# Patient Record
Sex: FEMALE | Race: WHITE | NOT HISPANIC OR LATINO | URBAN - METROPOLITAN AREA
[De-identification: names, ages, dates, MRNs, and addresses within clinical notes are randomized per-mention and may not be internally consistent; named-entity substitution may affect disease eponyms.]

---

## 2019-03-13 ENCOUNTER — OFFICE VISIT (OUTPATIENT)
Dept: OBSTETRICS AND GYNECOLOGY | Facility: CLINIC | Age: 19
End: 2019-03-13
Payer: COMMERCIAL

## 2019-03-13 VITALS — SYSTOLIC BLOOD PRESSURE: 98 MMHG | WEIGHT: 111.56 LBS | DIASTOLIC BLOOD PRESSURE: 64 MMHG

## 2019-03-13 DIAGNOSIS — N92.1 BREAKTHROUGH BLEEDING ON NEXPLANON: Primary | ICD-10-CM

## 2019-03-13 DIAGNOSIS — Z97.5 BREAKTHROUGH BLEEDING ON NEXPLANON: Primary | ICD-10-CM

## 2019-03-13 PROCEDURE — 99999 PR PBB SHADOW E&M-NEW PATIENT-LVL II: CPT | Mod: PBBFAC,,, | Performed by: OBSTETRICS & GYNECOLOGY

## 2019-03-13 PROCEDURE — 99203 PR OFFICE/OUTPT VISIT, NEW, LEVL III, 30-44 MIN: ICD-10-PCS | Mod: S$GLB,,, | Performed by: OBSTETRICS & GYNECOLOGY

## 2019-03-13 PROCEDURE — 99999 PR PBB SHADOW E&M-NEW PATIENT-LVL II: ICD-10-PCS | Mod: PBBFAC,,, | Performed by: OBSTETRICS & GYNECOLOGY

## 2019-03-13 PROCEDURE — 99203 OFFICE O/P NEW LOW 30 MIN: CPT | Mod: S$GLB,,, | Performed by: OBSTETRICS & GYNECOLOGY

## 2019-03-13 RX ORDER — ESTRADIOL 2 MG/1
2 TABLET ORAL DAILY
Qty: 14 TABLET | Refills: 0 | Status: SHIPPED | OUTPATIENT
Start: 2019-03-13 | End: 2019-05-29

## 2019-04-29 ENCOUNTER — TELEPHONE (OUTPATIENT)
Dept: OBSTETRICS AND GYNECOLOGY | Facility: CLINIC | Age: 19
End: 2019-04-29

## 2019-04-29 NOTE — TELEPHONE ENCOUNTER
----- Message from Bertha Painting sent at 4/29/2019  3:02 PM CDT -----  Contact: self [phone]  States that she is calling to schedule an appt with Dr Russell for nexplanon removal. Please call back at [phone]//thank you acc

## 2019-04-29 NOTE — TELEPHONE ENCOUNTER
Spoke to patient and she would just like her nexplanon removed. Does not want another one inserted. Scheduled her appointment for 05/29/19 at 9:30am to see Dr. Russell at the [facility] location. Patient verbalized understanding.

## 2019-05-22 ENCOUNTER — TELEPHONE (OUTPATIENT)
Dept: OBSTETRICS AND GYNECOLOGY | Facility: CLINIC | Age: 19
End: 2019-05-22

## 2019-05-22 NOTE — TELEPHONE ENCOUNTER
Did not see a referral in patient's chart.  Called patient, and she had no idea what I was talking about.  She states that she did not call asking about a referral.

## 2019-05-22 NOTE — TELEPHONE ENCOUNTER
Patient is requesting a call back from physicians staff in regards to the referral. Unsure what referral is for. Message sent from appointment desk Tyria Milligan.     Please assist.

## 2019-05-29 ENCOUNTER — PROCEDURE VISIT (OUTPATIENT)
Dept: OBSTETRICS AND GYNECOLOGY | Facility: CLINIC | Age: 19
End: 2019-05-29
Payer: COMMERCIAL

## 2019-05-29 VITALS
DIASTOLIC BLOOD PRESSURE: 62 MMHG | BODY MASS INDEX: 17.65 KG/M2 | HEIGHT: 67 IN | WEIGHT: 112.44 LBS | SYSTOLIC BLOOD PRESSURE: 108 MMHG

## 2019-05-29 DIAGNOSIS — Z30.013 ENCOUNTER FOR INITIAL PRESCRIPTION OF INJECTABLE CONTRACEPTIVE: ICD-10-CM

## 2019-05-29 DIAGNOSIS — Z30.46 NEXPLANON REMOVAL: Primary | ICD-10-CM

## 2019-05-29 PROCEDURE — 96372 PR INJECTION,THERAP/PROPH/DIAG2ST, IM OR SUBCUT: ICD-10-PCS | Mod: 59,S$GLB,, | Performed by: OBSTETRICS & GYNECOLOGY

## 2019-05-29 PROCEDURE — 96372 THER/PROPH/DIAG INJ SC/IM: CPT | Mod: 59,S$GLB,, | Performed by: OBSTETRICS & GYNECOLOGY

## 2019-05-29 PROCEDURE — 11982 REMOVE DRUG IMPLANT DEVICE: CPT | Mod: S$GLB,,, | Performed by: OBSTETRICS & GYNECOLOGY

## 2019-05-29 PROCEDURE — 11982 PR REMOVAL DRUG IMPLANT DEVICE: ICD-10-PCS | Mod: S$GLB,,, | Performed by: OBSTETRICS & GYNECOLOGY

## 2019-05-29 RX ORDER — MEDROXYPROGESTERONE ACETATE 150 MG/ML
150 INJECTION, SUSPENSION INTRAMUSCULAR
Status: DISCONTINUED | OUTPATIENT
Start: 2019-05-29 | End: 2019-11-20

## 2019-05-29 RX ADMIN — MEDROXYPROGESTERONE ACETATE 150 MG: 150 INJECTION, SUSPENSION INTRAMUSCULAR at 10:05

## 2019-05-29 NOTE — NURSING
Verified pt with two identifiers name and . Allergies and medications reviewed. Depo provera 150 mg/ml administered IM to right ventrogluteal while using aseptic technique. No discomfort noted. Pt tolerated well. Advised pt to wait 15 minutes in the lobby to monitor for side effects. Next scheduled injection 19 @ 1100 at the [facility].  Pt verbalized understanding.

## 2019-05-29 NOTE — PROCEDURES
Procedures   PROCEDURE:  Nexplanon implant palpated well in medial left arm. Area prepped with betadine. Lidocaine with epi locally injected subdermally. #11 blade used to score skin over distal tip & dissected down to implant. Hemostats used to remove implant intact by grasping distal tip. Minimal blood loss. Pt tolerated procedure. Pt desires depo-provera    A/P  1. nexplanon removal, contraceptive management  2. Depo-provera 150 mg IM q90d x 1 year

## 2019-11-20 ENCOUNTER — OFFICE VISIT (OUTPATIENT)
Dept: OBSTETRICS AND GYNECOLOGY | Facility: CLINIC | Age: 19
End: 2019-11-20
Payer: COMMERCIAL

## 2019-11-20 VITALS
SYSTOLIC BLOOD PRESSURE: 100 MMHG | WEIGHT: 115.5 LBS | HEIGHT: 67 IN | BODY MASS INDEX: 18.13 KG/M2 | DIASTOLIC BLOOD PRESSURE: 54 MMHG

## 2019-11-20 DIAGNOSIS — N93.0 POSTCOITAL BLEEDING: ICD-10-CM

## 2019-11-20 DIAGNOSIS — N76.2 ACUTE VULVITIS: Primary | ICD-10-CM

## 2019-11-20 DIAGNOSIS — N89.8 VAGINAL DISCHARGE: ICD-10-CM

## 2019-11-20 PROCEDURE — 87210 SMEAR WET MOUNT SALINE/INK: CPT | Mod: QW,S$GLB,, | Performed by: OBSTETRICS & GYNECOLOGY

## 2019-11-20 PROCEDURE — 99999 PR PBB SHADOW E&M-EST. PATIENT-LVL II: CPT | Mod: PBBFAC,,, | Performed by: OBSTETRICS & GYNECOLOGY

## 2019-11-20 PROCEDURE — 99213 OFFICE O/P EST LOW 20 MIN: CPT | Mod: S$GLB,,, | Performed by: OBSTETRICS & GYNECOLOGY

## 2019-11-20 PROCEDURE — 3008F BODY MASS INDEX DOCD: CPT | Mod: CPTII,S$GLB,, | Performed by: OBSTETRICS & GYNECOLOGY

## 2019-11-20 PROCEDURE — 99213 PR OFFICE/OUTPT VISIT, EST, LEVL III, 20-29 MIN: ICD-10-PCS | Mod: S$GLB,,, | Performed by: OBSTETRICS & GYNECOLOGY

## 2019-11-20 PROCEDURE — 99999 PR PBB SHADOW E&M-EST. PATIENT-LVL II: ICD-10-PCS | Mod: PBBFAC,,, | Performed by: OBSTETRICS & GYNECOLOGY

## 2019-11-20 PROCEDURE — 87210 PR  SMEAR,STAIN,WET MNT,INTERP: ICD-10-PCS | Mod: QW,S$GLB,, | Performed by: OBSTETRICS & GYNECOLOGY

## 2019-11-20 PROCEDURE — 3008F PR BODY MASS INDEX (BMI) DOCUMENTED: ICD-10-PCS | Mod: CPTII,S$GLB,, | Performed by: OBSTETRICS & GYNECOLOGY

## 2019-11-20 RX ORDER — CLOTRIMAZOLE AND BETAMETHASONE DIPROPIONATE 10; .64 MG/G; MG/G
CREAM TOPICAL 2 TIMES DAILY
Qty: 15 G | Refills: 1 | Status: SHIPPED | OUTPATIENT
Start: 2019-11-20 | End: 2019-11-30

## 2019-11-20 RX ORDER — NORGESTIMATE AND ETHINYL ESTRADIOL 0.25-0.035
1 KIT ORAL DAILY
Refills: 11 | COMMUNITY
Start: 2019-10-31 | End: 2021-04-14

## 2019-11-20 NOTE — PROGRESS NOTES
Subjective:       Patient ID: Shantell Pérez is a 19 y.o. female.    Chief Complaint:  Oligomenorrhea (Concerns) and Painful Lookout      History of Present Illness  HPI  c/o postcoital bleeding & vaginal pain during intercourse. feels like friction but not sure. does not use any lubricants or sex toys. has only had intercourse 4x this past year w/ same partner & had problems all times.  Finishing up first pack of ocps. Tested neg for leonid/chlam last month w/ [place]gyn [place]    GYN & OB History  No LMP recorded. (Menstrual status: Birth Control).   Date of Last Pap: No result found    OB History    Para Term  AB Living   0 0 0 0 0 0   SAB TAB Ectopic Multiple Live Births   0 0 0 0 0       Review of Systems  Review of Systems   All other systems reviewed and are negative.      Objective:     Physical Exam   Constitutional: She is oriented to person, place, and time. She appears well-developed and well-nourished.   Pulmonary/Chest: Effort normal.   Genitourinary:   Genitourinary Comments: Few areas of vulvar skin fissures & ?pinpoint ulcerations. Normal vaginal mucosa/cervix   Musculoskeletal: Normal range of motion.   Neurological: She is alert and oriented to person, place, and time.   Skin: Skin is warm and dry.   Psychiatric: She has a normal mood and affect. Her behavior is normal.      Wet prep neg     Assessment:        1. Acute vulvitis    2. Postcoital bleeding    3. Vaginal discharge         Plan:      1. aquaphor & lotrisone-vulvar irritation possible cause of dyspareunia

## 2020-06-26 ENCOUNTER — OFFICE VISIT (OUTPATIENT)
Dept: URGENT CARE | Facility: CLINIC | Age: 20
End: 2020-06-26
Payer: COMMERCIAL

## 2020-06-26 VITALS
HEIGHT: 67 IN | DIASTOLIC BLOOD PRESSURE: 65 MMHG | BODY MASS INDEX: 18.05 KG/M2 | TEMPERATURE: 98 F | SYSTOLIC BLOOD PRESSURE: 117 MMHG | WEIGHT: 115 LBS | HEART RATE: 67 BPM | OXYGEN SATURATION: 98 %

## 2020-06-26 DIAGNOSIS — Z13.9 ENCOUNTER FOR SCREENING: Primary | ICD-10-CM

## 2020-06-26 PROCEDURE — U0003 INFECTIOUS AGENT DETECTION BY NUCLEIC ACID (DNA OR RNA); SEVERE ACUTE RESPIRATORY SYNDROME CORONAVIRUS 2 (SARS-COV-2) (CORONAVIRUS DISEASE [COVID-19]), AMPLIFIED PROBE TECHNIQUE, MAKING USE OF HIGH THROUGHPUT TECHNOLOGIES AS DESCRIBED BY CMS-2020-01-R: HCPCS

## 2020-06-26 PROCEDURE — 99201 PR OFFICE/OUTPT VISIT,NEW,LEVL I: CPT | Mod: S$GLB,,, | Performed by: NURSE PRACTITIONER

## 2020-06-26 PROCEDURE — 99201 PR OFFICE/OUTPT VISIT,NEW,LEVL I: ICD-10-PCS | Mod: S$GLB,,, | Performed by: NURSE PRACTITIONER

## 2020-06-26 NOTE — PATIENT INSTRUCTIONS

## 2020-06-26 NOTE — PROGRESS NOTES
"Subjective:       Patient ID: Shantell Pérez is a 20 y.o. female.    Vitals:  height is 5' 7" (1.702 m) and weight is 52.2 kg (115 lb). Her temperature is 98.1 °F (36.7 °C). Her blood pressure is 117/65 and her pulse is 67. Her oxygen saturation is 98%.     Chief Complaint: COVID-19 Concerns    Pt states she was exposed to covid at work. Denies having any symptoms.      Constitution: Negative for appetite change, chills, fatigue and fever.   HENT: Negative for ear pain, congestion and sore throat.    Neck: Negative for painful lymph nodes.   Cardiovascular: Negative for chest pain and leg swelling.   Eyes: Negative for eye discharge, eye redness, double vision and blurred vision.   Respiratory: Negative for cough and shortness of breath.    Gastrointestinal: Negative for nausea, vomiting and diarrhea.   Genitourinary: Negative for dysuria, frequency, urgency and history of kidney stones.   Musculoskeletal: Negative for joint pain, joint swelling, muscle cramps and muscle ache.   Skin: Negative for color change, pale, rash and bruising.   Allergic/Immunologic: Negative for seasonal allergies.   Neurological: Negative for dizziness, history of vertigo, light-headedness, passing out, headaches and seizures.   Hematologic/Lymphatic: Negative for swollen lymph nodes.   Psychiatric/Behavioral: Negative for nervous/anxious, sleep disturbance and depression. The patient is not nervous/anxious.        Objective:      Physical Exam   Constitutional: She appears well-developed.   Limited physical exam due to current state of emergency- no acute distress   Pulmonary/Chest: Effort normal and breath sounds normal.   Abdominal: Normal appearance.   Musculoskeletal: Normal range of motion.   Neurological: She is alert.   Nursing note and vitals reviewed.        Assessment:       1. Encounter for screening        Plan:         Encounter for screening  -     COVID-19 Routine Screening           "

## 2020-06-30 ENCOUNTER — TELEPHONE (OUTPATIENT)
Dept: URGENT CARE | Facility: CLINIC | Age: 20
End: 2020-06-30

## 2020-06-30 LAB — SARS-COV-2 RNA RESP QL NAA+PROBE: NOT DETECTED

## 2020-07-01 ENCOUNTER — TELEPHONE (OUTPATIENT)
Dept: URGENT CARE | Facility: CLINIC | Age: 20
End: 2020-07-01

## 2020-07-01 NOTE — TELEPHONE ENCOUNTER
Left voicemail for pt to return call to clinic.     ----- Message from Walt Porras MD sent at 6/30/2020  7:03 PM CDT -----  Please call pat for negative COVID PCR test result.

## 2020-07-01 NOTE — TELEPHONE ENCOUNTER
----- Message from Walt Porras MD sent at 6/30/2020  7:03 PM CDT -----  Please call pat for negative COVID PCR test result.

## 2020-07-06 NOTE — PROGRESS NOTES
Subjective:       Patient ID: Shantell Pérez is a 20 y.o. female.    Chief Complaint:  Well Woman (NP  Annual , pt declines std screening  --  No Hx of Pap )      History of Present Illness.  Shantell Pérez is a 20 y.o. female.  She has no breast or urinary symptoms.  She has no menorrhagia, oligomenorrhea, bleeding betweeen menses, postcoital bleeding, dysmenorrhea, pelvic pain, dyspareunia, vaginal dryness, vaginal discharge, or sexual complaints.  She was told a year ago by MD she has fissures near anus.  She gave her a cream but it never helped.  No history of HSV.  Has a partner x 1 year.  She takes OCP but isn't consistent.    GYN & OB History  Patient's last menstrual period was 2020 (exact date).   Last Pap: No result found  Gardasil:  Yes    OB History    Para Term  AB Living   0 0 0 0 0 0   SAB TAB Ectopic Multiple Live Births   0 0 0 0 0   Obstetric Comments   Menarche ~ 11       Past Medical History:   Diagnosis Date    Gastritis     Nexplanon insertion 2018    Removed  19    Nexplanon removal 2019    Oral contraceptive use     Vaccine for human papilloma virus (HPV) types 6, 11, 16, and 18 administered     3 of 3 - per pt      Past Surgical History:   Procedure Laterality Date    NO PAST SURGERIES       Family History   Problem Relation Age of Onset    Diabetes Paternal Grandmother     Diabetes Maternal Grandmother     No Known Problems Father     No Known Problems Mother     No Known Problems Brother     No Known Problems Sister     No Known Problems Paternal Grandfather     Hyperlipidemia Maternal Grandfather     Breast cancer Neg Hx     Colon cancer Neg Hx     Ovarian cancer Neg Hx     Prostate cancer Neg Hx      Social History     Tobacco Use    Smoking status: Never Smoker    Smokeless tobacco: Never Used   Substance Use Topics    Alcohol use: Yes     Comment: social    Drug use: No       Current Outpatient Medications:      "FEMYNOR 0.25-35 mg-mcg per tablet, Take 1 tablet by mouth once daily., Disp: , Rfl: 11    Review of patient's allergies indicates:  No Known Allergies    Review of Systems  Review of Systems   Constitutional: Negative for fatigue.   HENT: Negative for trouble swallowing.    Eyes: Negative for visual disturbance.   Respiratory: Negative for cough and shortness of breath.    Cardiovascular: Negative for chest pain.   Gastrointestinal: Negative for abdominal distention, abdominal pain, blood in stool, nausea and vomiting.   Genitourinary: Negative for difficulty urinating, dyspareunia, dysuria, flank pain, frequency, hematuria, pelvic pain, urgency, vaginal bleeding, vaginal discharge and vaginal pain.   Musculoskeletal: Negative for arthralgias.   Skin: Negative for rash.   Neurological: Negative for dizziness and headaches.   Psychiatric/Behavioral: Negative for sleep disturbance. The patient is not nervous/anxious.         Objective:     Vitals:    07/07/20 1339   BP: 110/70   Weight: 54 kg (119 lb 0.8 oz)   Height: 5' 7" (1.702 m)   PainSc: 0-No pain     Body mass index is 18.65 kg/m².      Physical Exam:   Constitutional: She is oriented to person, place, and time. She appears well-developed and well-nourished.    HENT:   Head: Normocephalic.     Neck: Normal range of motion. No thyromegaly present.     Pulmonary/Chest: Right breast exhibits no mass, no nipple discharge, no skin change, no tenderness and no swelling. Left breast exhibits no mass, no nipple discharge, no skin change, no tenderness and no swelling. Breasts are symmetrical.        Abdominal: Soft. Normal appearance and bowel sounds are normal. She exhibits no distension. There is no abdominal tenderness.     Genitourinary:    Vagina and uterus normal.      Pelvic exam was performed with patient supine.   There is no rash, tenderness, lesion or injury on the right labia. There is no rash, tenderness, lesion or injury on the left labia. Cervix is " normal. Right adnexum displays no mass, no tenderness and no fullness. Left adnexum displays no mass, no tenderness and no fullness. No erythema in the vagina. Cervix exhibits no motion tenderness and no discharge.    Genitourinary Comments: Small, tender ulcerations at introitus and perirectal.  Mild erythema perirectal             Musculoskeletal: Normal range of motion.      Lymphadenopathy:        Right: No supraclavicular adenopathy present.        Left: No supraclavicular adenopathy present.    Neurological: She is alert and oriented to person, place, and time.    Skin: Skin is warm and dry.    Psychiatric: She has a normal mood and affect.        Assessment/ Plan:     Screening for malignant neoplasm of cervix  -     Liquid-Based Pap Smear, Screening    Encounter for gynecological examination    Screening for cervical cancer    Screen for sexually transmitted diseases  -     C. trachomatis/N. gonorrhoeae by AMP DNA  -     HIV-1 and HIV-2 antibodies; Future; Expected date: 07/07/2020  -     RPR; Future; Expected date: 07/07/2020  -     Hepatitis B surface antigen; Future; Expected date: 07/07/2020    Vulvar ulcer  -     Herpes simplex type 1 & 2 IgM,Herpes IgM; Future; Expected date: 07/07/2020  -     Herpes Simplex Virus (HSV) Types 1 & 2, IgG, Herpes Titer; Future; Expected date: 07/07/2020        Routine pap smears.  Self breast exam  Diet and exercise discussed.  Contraception reviewed/discussed.  STD testing discussed and done.  Follow-up with me prn

## 2020-07-07 ENCOUNTER — OFFICE VISIT (OUTPATIENT)
Dept: OBSTETRICS AND GYNECOLOGY | Facility: CLINIC | Age: 20
End: 2020-07-07
Payer: COMMERCIAL

## 2020-07-07 ENCOUNTER — LAB VISIT (OUTPATIENT)
Dept: LAB | Facility: HOSPITAL | Age: 20
End: 2020-07-07
Attending: OBSTETRICS & GYNECOLOGY
Payer: COMMERCIAL

## 2020-07-07 VITALS
BODY MASS INDEX: 18.69 KG/M2 | WEIGHT: 119.06 LBS | HEIGHT: 67 IN | DIASTOLIC BLOOD PRESSURE: 70 MMHG | SYSTOLIC BLOOD PRESSURE: 110 MMHG

## 2020-07-07 DIAGNOSIS — N76.6 VULVAR ULCER: ICD-10-CM

## 2020-07-07 DIAGNOSIS — Z11.3 SCREEN FOR SEXUALLY TRANSMITTED DISEASES: ICD-10-CM

## 2020-07-07 DIAGNOSIS — Z12.4 SCREENING FOR CERVICAL CANCER: ICD-10-CM

## 2020-07-07 DIAGNOSIS — Z12.4 SCREENING FOR MALIGNANT NEOPLASM OF CERVIX: Primary | ICD-10-CM

## 2020-07-07 DIAGNOSIS — Z01.419 ENCOUNTER FOR GYNECOLOGICAL EXAMINATION: ICD-10-CM

## 2020-07-07 PROCEDURE — 99395 PR PREVENTIVE VISIT,EST,18-39: ICD-10-PCS | Mod: S$GLB,,, | Performed by: OBSTETRICS & GYNECOLOGY

## 2020-07-07 PROCEDURE — 87340 HEPATITIS B SURFACE AG IA: CPT

## 2020-07-07 PROCEDURE — 99999 PR PBB SHADOW E&M-EST. PATIENT-LVL III: CPT | Mod: PBBFAC,,, | Performed by: OBSTETRICS & GYNECOLOGY

## 2020-07-07 PROCEDURE — 86694 HERPES SIMPLEX NES ANTBDY: CPT

## 2020-07-07 PROCEDURE — 87491 CHLMYD TRACH DNA AMP PROBE: CPT

## 2020-07-07 PROCEDURE — 99395 PREV VISIT EST AGE 18-39: CPT | Mod: S$GLB,,, | Performed by: OBSTETRICS & GYNECOLOGY

## 2020-07-07 PROCEDURE — 86592 SYPHILIS TEST NON-TREP QUAL: CPT

## 2020-07-07 PROCEDURE — 86703 HIV-1/HIV-2 1 RESULT ANTBDY: CPT

## 2020-07-07 PROCEDURE — 87529 HSV DNA AMP PROBE: CPT

## 2020-07-07 PROCEDURE — 86696 HERPES SIMPLEX TYPE 2 TEST: CPT

## 2020-07-07 PROCEDURE — 99999 PR PBB SHADOW E&M-EST. PATIENT-LVL III: ICD-10-PCS | Mod: PBBFAC,,, | Performed by: OBSTETRICS & GYNECOLOGY

## 2020-07-07 PROCEDURE — 88175 CYTOPATH C/V AUTO FLUID REDO: CPT

## 2020-07-08 LAB
C TRACH DNA SPEC QL NAA+PROBE: NOT DETECTED
HBV SURFACE AG SERPL QL IA: NEGATIVE
HIV 1+2 AB+HIV1 P24 AG SERPL QL IA: NEGATIVE
N GONORRHOEA DNA SPEC QL NAA+PROBE: NOT DETECTED
RPR SER QL: NORMAL

## 2020-07-10 LAB
HSV AB, IGM BY EIA: 0.4 INDEX
HSV1 DNA SPEC QL NAA+PROBE: NEGATIVE
HSV1 IGG SERPL QL IA: NEGATIVE
HSV2 DNA SPEC QL NAA+PROBE: NEGATIVE
HSV2 IGG SERPL QL IA: NEGATIVE
SPECIMEN SOURCE: NORMAL

## 2020-07-14 LAB
FINAL PATHOLOGIC DIAGNOSIS: NORMAL
Lab: NORMAL

## 2020-07-17 ENCOUNTER — TELEPHONE (OUTPATIENT)
Dept: OBSTETRICS AND GYNECOLOGY | Facility: CLINIC | Age: 20
End: 2020-07-17

## 2020-07-17 ENCOUNTER — OFFICE VISIT (OUTPATIENT)
Dept: OBSTETRICS AND GYNECOLOGY | Facility: CLINIC | Age: 20
End: 2020-07-17
Attending: OBSTETRICS & GYNECOLOGY
Payer: COMMERCIAL

## 2020-07-17 VITALS
HEIGHT: 67 IN | DIASTOLIC BLOOD PRESSURE: 60 MMHG | SYSTOLIC BLOOD PRESSURE: 90 MMHG | BODY MASS INDEX: 18.35 KG/M2 | WEIGHT: 116.88 LBS

## 2020-07-17 DIAGNOSIS — N76.3 CHRONIC VULVITIS: Primary | ICD-10-CM

## 2020-07-17 PROCEDURE — 99999 PR PBB SHADOW E&M-EST. PATIENT-LVL III: ICD-10-PCS | Mod: PBBFAC,,, | Performed by: OBSTETRICS & GYNECOLOGY

## 2020-07-17 PROCEDURE — 99499 NO LOS: ICD-10-PCS | Mod: S$GLB,,, | Performed by: OBSTETRICS & GYNECOLOGY

## 2020-07-17 PROCEDURE — 56605 PR BIOPSY VULVA/PERINEUM,ONE LESN: ICD-10-PCS | Mod: S$GLB,,, | Performed by: OBSTETRICS & GYNECOLOGY

## 2020-07-17 PROCEDURE — 88305 TISSUE EXAM BY PATHOLOGIST: CPT | Mod: 26,,, | Performed by: PATHOLOGY

## 2020-07-17 PROCEDURE — 88305 TISSUE EXAM BY PATHOLOGIST: ICD-10-PCS | Mod: 26,,, | Performed by: PATHOLOGY

## 2020-07-17 PROCEDURE — 88305 TISSUE EXAM BY PATHOLOGIST: CPT | Performed by: PATHOLOGY

## 2020-07-17 PROCEDURE — 99499 UNLISTED E&M SERVICE: CPT | Mod: S$GLB,,, | Performed by: OBSTETRICS & GYNECOLOGY

## 2020-07-17 PROCEDURE — 56605 BIOPSY OF VULVA/PERINEUM: CPT | Mod: S$GLB,,, | Performed by: OBSTETRICS & GYNECOLOGY

## 2020-07-17 PROCEDURE — 99999 PR PBB SHADOW E&M-EST. PATIENT-LVL III: CPT | Mod: PBBFAC,,, | Performed by: OBSTETRICS & GYNECOLOGY

## 2020-07-17 NOTE — PROCEDURES
Procedures   Procedure:  Biopsy of Vulva    Risks, benefits, and alternatives discussed.  Informed consent obtained and time-out done    Procedure in detail:  Betadine was applied x 3 to the perineal body.  8 cc of 1% lidocaine without epinephrine was injected into the perineal body.  A 4 mm Keye's punch biopsy was done and sent to pathology for evaluation.  Silver nitrate was applied for hemostasis.  There were no complications.    Assessment:  Chronic vulvitis ( x 1 year)    Plan:  Biopsy done - perineal body  Stiz baths, soap and water.  Ibuprofen 600 mg every 6 hours prn pain.  Follow-up prn.

## 2020-07-17 NOTE — TELEPHONE ENCOUNTER
----- Message from Jania Loera MD sent at 7/14/2020  4:35 PM CDT -----  Add for 11:45 this Friday for vulvar biopsy    Results given.  Multiple vulvar ulcers.  Not HSV via cx or labs.   Will biopsy.  If still uncertain, will refer to rheumatology to r/o Behcet's

## 2020-07-20 ENCOUNTER — TELEPHONE (OUTPATIENT)
Dept: OBSTETRICS AND GYNECOLOGY | Facility: CLINIC | Age: 20
End: 2020-07-20

## 2020-07-27 DIAGNOSIS — N76.3 SUBACUTE VULVITIS: Primary | ICD-10-CM

## 2020-07-27 LAB — FINAL PATHOLOGIC DIAGNOSIS: NORMAL

## 2020-07-27 RX ORDER — TRIAMCINOLONE ACETONIDE 1 MG/G
CREAM TOPICAL 2 TIMES DAILY
Qty: 15 G | Refills: 3 | Status: SHIPPED | OUTPATIENT
Start: 2020-07-27 | End: 2021-04-14

## 2021-04-12 ENCOUNTER — TELEPHONE (OUTPATIENT)
Dept: OBSTETRICS AND GYNECOLOGY | Facility: CLINIC | Age: 21
End: 2021-04-12

## 2021-04-14 ENCOUNTER — OFFICE VISIT (OUTPATIENT)
Dept: OBSTETRICS AND GYNECOLOGY | Facility: CLINIC | Age: 21
End: 2021-04-14
Attending: OBSTETRICS & GYNECOLOGY
Payer: COMMERCIAL

## 2021-04-14 VITALS
WEIGHT: 131.5 LBS | SYSTOLIC BLOOD PRESSURE: 110 MMHG | DIASTOLIC BLOOD PRESSURE: 70 MMHG | BODY MASS INDEX: 20.64 KG/M2 | HEIGHT: 67 IN

## 2021-04-14 DIAGNOSIS — N63.13 BREAST LUMP ON RIGHT SIDE AT 7 O'CLOCK POSITION: Primary | ICD-10-CM

## 2021-04-14 DIAGNOSIS — K60.2 ANAL FISSURE, UNSPECIFIED: ICD-10-CM

## 2021-04-14 PROCEDURE — 99213 PR OFFICE/OUTPT VISIT, EST, LEVL III, 20-29 MIN: ICD-10-PCS | Mod: 25,S$GLB,, | Performed by: OBSTETRICS & GYNECOLOGY

## 2021-04-14 PROCEDURE — 88305 TISSUE EXAM BY PATHOLOGIST: CPT | Mod: 26,,, | Performed by: PATHOLOGY

## 2021-04-14 PROCEDURE — 99999 PR PBB SHADOW E&M-EST. PATIENT-LVL III: ICD-10-PCS | Mod: PBBFAC,,, | Performed by: OBSTETRICS & GYNECOLOGY

## 2021-04-14 PROCEDURE — 1125F AMNT PAIN NOTED PAIN PRSNT: CPT | Mod: S$GLB,,, | Performed by: OBSTETRICS & GYNECOLOGY

## 2021-04-14 PROCEDURE — 1125F PR PAIN SEVERITY QUANTIFIED, PAIN PRESENT: ICD-10-PCS | Mod: S$GLB,,, | Performed by: OBSTETRICS & GYNECOLOGY

## 2021-04-14 PROCEDURE — 56605 PR BIOPSY VULVA/PERINEUM,ONE LESN: ICD-10-PCS | Mod: S$GLB,,, | Performed by: OBSTETRICS & GYNECOLOGY

## 2021-04-14 PROCEDURE — 88342 IMHCHEM/IMCYTCHM 1ST ANTB: CPT | Performed by: PATHOLOGY

## 2021-04-14 PROCEDURE — 88312 SPECIAL STAINS GROUP 1: CPT | Performed by: PATHOLOGY

## 2021-04-14 PROCEDURE — 99213 OFFICE O/P EST LOW 20 MIN: CPT | Mod: 25,S$GLB,, | Performed by: OBSTETRICS & GYNECOLOGY

## 2021-04-14 PROCEDURE — 88342 CHG IMMUNOCYTOCHEMISTRY: ICD-10-PCS | Mod: 26,,, | Performed by: PATHOLOGY

## 2021-04-14 PROCEDURE — 88312 SPECIAL STAINS GROUP 1: CPT | Mod: 26,,, | Performed by: PATHOLOGY

## 2021-04-14 PROCEDURE — 88305 TISSUE EXAM BY PATHOLOGIST: CPT | Performed by: PATHOLOGY

## 2021-04-14 PROCEDURE — 88342 IMHCHEM/IMCYTCHM 1ST ANTB: CPT | Mod: 26,,, | Performed by: PATHOLOGY

## 2021-04-14 PROCEDURE — 88312 PR  SPECIAL STAINS,GROUP I: ICD-10-PCS | Mod: 26,,, | Performed by: PATHOLOGY

## 2021-04-14 PROCEDURE — 99999 PR PBB SHADOW E&M-EST. PATIENT-LVL III: CPT | Mod: PBBFAC,,, | Performed by: OBSTETRICS & GYNECOLOGY

## 2021-04-14 PROCEDURE — 56605 BIOPSY OF VULVA/PERINEUM: CPT | Mod: S$GLB,,, | Performed by: OBSTETRICS & GYNECOLOGY

## 2021-04-14 PROCEDURE — 3008F PR BODY MASS INDEX (BMI) DOCUMENTED: ICD-10-PCS | Mod: CPTII,S$GLB,, | Performed by: OBSTETRICS & GYNECOLOGY

## 2021-04-14 PROCEDURE — 88305 TISSUE EXAM BY PATHOLOGIST: ICD-10-PCS | Mod: 26,,, | Performed by: PATHOLOGY

## 2021-04-14 PROCEDURE — 3008F BODY MASS INDEX DOCD: CPT | Mod: CPTII,S$GLB,, | Performed by: OBSTETRICS & GYNECOLOGY

## 2021-04-15 ENCOUNTER — HOSPITAL ENCOUNTER (OUTPATIENT)
Dept: RADIOLOGY | Facility: HOSPITAL | Age: 21
Discharge: HOME | End: 2021-04-15
Attending: OBSTETRICS & GYNECOLOGY
Payer: COMMERCIAL

## 2021-04-15 VITALS — BODY MASS INDEX: 20.52 KG/M2 | WEIGHT: 131 LBS

## 2021-04-15 DIAGNOSIS — N63.13 BREAST LUMP ON RIGHT SIDE AT 7 O'CLOCK POSITION: ICD-10-CM

## 2021-04-15 PROCEDURE — 76641 US BREAST RIGHT COMPLETE: ICD-10-PCS | Mod: 26,RT,, | Performed by: RADIOLOGY

## 2021-04-15 PROCEDURE — 76641 ULTRASOUND BREAST COMPLETE: CPT | Mod: TC,RT

## 2021-04-15 PROCEDURE — 76641 ULTRASOUND BREAST COMPLETE: CPT | Mod: 26,RT,, | Performed by: RADIOLOGY

## 2021-04-22 DIAGNOSIS — L30.9 DERMATITIS OF ANOGENITAL REGION: Primary | ICD-10-CM

## 2021-04-22 LAB
FINAL PATHOLOGIC DIAGNOSIS: NORMAL
GROSS: NORMAL
Lab: NORMAL
MICROSCOPIC EXAM: NORMAL

## 2021-04-22 RX ORDER — CLOBETASOL PROPIONATE 0.5 MG/G
CREAM TOPICAL
Qty: 45 G | Refills: 1 | Status: SHIPPED | OUTPATIENT
Start: 2021-04-22

## 2022-03-23 ENCOUNTER — TELEPHONE (OUTPATIENT)
Dept: OBSTETRICS AND GYNECOLOGY | Facility: CLINIC | Age: 22
End: 2022-03-23
Payer: COMMERCIAL

## 2022-03-23 NOTE — TELEPHONE ENCOUNTER
Called and spoke with pt     Pr said someone told her to go to her PCP     She went to PCP and dropped urine off with them

## 2022-10-14 NOTE — TELEPHONE ENCOUNTER
Lmor for pt to return my call.    No Involvement of Care in pt's chart for me to discuss anything in pt's chart    Normal rate, regular rhythm.  Heart sounds S1, S2.  No murmurs, rubs or gallops.